# Patient Record
Sex: MALE | Race: WHITE
[De-identification: names, ages, dates, MRNs, and addresses within clinical notes are randomized per-mention and may not be internally consistent; named-entity substitution may affect disease eponyms.]

---

## 2020-10-27 ENCOUNTER — HOSPITAL ENCOUNTER (OUTPATIENT)
Age: 40
End: 2020-10-27
Payer: COMMERCIAL

## 2020-10-27 DIAGNOSIS — R10.11: Primary | ICD-10-CM

## 2020-10-27 LAB
ADD MANUAL DIFF / SLIDE REVIEW: NO
ALBUMIN SERPL-MCNC: 4.8 G/DL (ref 3.5–5)
ALBUMIN/GLOB SERPL: 1.3 {RATIO} (ref 1–2.8)
ALP SERPL-CCNC: 76 U/L (ref 38–126)
ALT SERPL-CCNC: 55 IU/L (ref ?–50)
BUN SERPL-MCNC: 15 MG/DL (ref 9–20)
CALCIUM SERPL-MCNC: 9.6 MG/DL (ref 8.4–10.2)
CHLORIDE SERPL-SCNC: 102 MMOL/L (ref 98–107)
CO2 SERPL-SCNC: 31 MMOL/L (ref 22–32)
ESTIMATED GLOMERULAR FILT RATE: > 60 ML/MIN (ref 60–?)
GLOBULIN SER CALC-MCNC: 3.6 G/DL (ref 1.7–4.1)
GLUCOSE SERPL-MCNC: 95 MG/DL (ref 70–100)
HEMATOCRIT: 44.4 % (ref 41–53)
HEMOGLOBIN: 15 G/DL (ref 13.5–17.5)
HEMOLYSIS: < 15 (ref 0–50)
LIPASE SERPL-CCNC: 99 U/L (ref 23–300)
LYMPHOCYTES # SPEC AUTO: 3200 /UL (ref 1100–4500)
MCV RBC: 92.5 FL (ref 80–100)
MEAN CORPUSCULAR HEMOGLOBIN: 31.2 PG (ref 26–34)
MEAN CORPUSCULAR HGB CONC: 33.8 % (ref 30–36)
PLATELET COUNT: 188 X10^3/UL (ref 150–400)
POTASSIUM SERPL-SCNC: 4 MMOL/L (ref 3.4–5.1)
PROT SERPL-MCNC: 8.4 G/DL (ref 6.3–8.2)
SODIUM SERPL-SCNC: 139 MMOL/L (ref 137–145)

## 2020-10-27 PROCEDURE — 36415 COLL VENOUS BLD VENIPUNCTURE: CPT

## 2020-10-27 PROCEDURE — 80053 COMPREHEN METABOLIC PANEL: CPT

## 2020-10-27 PROCEDURE — 85651 RBC SED RATE NONAUTOMATED: CPT

## 2020-10-27 PROCEDURE — 85025 COMPLETE CBC W/AUTO DIFF WBC: CPT

## 2020-10-27 PROCEDURE — 83690 ASSAY OF LIPASE: CPT

## 2020-10-27 PROCEDURE — 86140 C-REACTIVE PROTEIN: CPT

## 2021-09-27 ENCOUNTER — HOSPITAL ENCOUNTER (OUTPATIENT)
Age: 41
End: 2021-09-27
Payer: COMMERCIAL

## 2021-09-27 DIAGNOSIS — U07.1: Primary | ICD-10-CM

## 2021-09-27 LAB — SARS-COV-2 RDRP RESP QL NAA+PROBE: POSITIVE

## 2021-09-27 PROCEDURE — 87635 SARS-COV-2 COVID-19 AMP PRB: CPT

## 2022-03-14 ENCOUNTER — HOSPITAL ENCOUNTER (EMERGENCY)
Age: 42
Discharge: HOME | End: 2022-03-14
Payer: COMMERCIAL

## 2022-03-14 VITALS — RESPIRATION RATE: 18 BRPM | HEART RATE: 52 BPM | OXYGEN SATURATION: 99 %

## 2022-03-14 VITALS
OXYGEN SATURATION: 99 % | RESPIRATION RATE: 22 BRPM | SYSTOLIC BLOOD PRESSURE: 196 MMHG | HEART RATE: 52 BPM | DIASTOLIC BLOOD PRESSURE: 104 MMHG

## 2022-03-14 VITALS — BODY MASS INDEX: 29.7 KG/M2

## 2022-03-14 VITALS — HEART RATE: 49 BPM | RESPIRATION RATE: 18 BRPM | OXYGEN SATURATION: 99 %

## 2022-03-14 VITALS
TEMPERATURE: 97.8 F | HEART RATE: 56 BPM | SYSTOLIC BLOOD PRESSURE: 239 MMHG | OXYGEN SATURATION: 100 % | DIASTOLIC BLOOD PRESSURE: 127 MMHG | RESPIRATION RATE: 17 BRPM

## 2022-03-14 VITALS
SYSTOLIC BLOOD PRESSURE: 172 MMHG | DIASTOLIC BLOOD PRESSURE: 97 MMHG | HEART RATE: 51 BPM | OXYGEN SATURATION: 98 % | RESPIRATION RATE: 17 BRPM

## 2022-03-14 VITALS — OXYGEN SATURATION: 98 % | HEART RATE: 65 BPM | RESPIRATION RATE: 21 BRPM

## 2022-03-14 VITALS — OXYGEN SATURATION: 99 % | HEART RATE: 49 BPM | RESPIRATION RATE: 17 BRPM

## 2022-03-14 VITALS — RESPIRATION RATE: 21 BRPM | HEART RATE: 64 BPM | OXYGEN SATURATION: 99 %

## 2022-03-14 VITALS — HEART RATE: 48 BPM | OXYGEN SATURATION: 99 % | RESPIRATION RATE: 23 BRPM

## 2022-03-14 DIAGNOSIS — Z20.822: ICD-10-CM

## 2022-03-14 DIAGNOSIS — R51.9: Primary | ICD-10-CM

## 2022-03-14 LAB
ADD MANUAL DIFF / SLIDE REVIEW: NO
ALBUMIN SERPL-MCNC: 5.2 G/DL (ref 3.5–5)
ALBUMIN/GLOB SERPL: 1.5 {RATIO} (ref 1–2.8)
ALP SERPL-CCNC: 81 U/L (ref 38–126)
ALT SERPL-CCNC: 37 IU/L (ref ?–50)
BUN SERPL-MCNC: 16 MG/DL (ref 9–20)
CALCIUM SERPL-MCNC: 9.5 MG/DL (ref 8.4–10.2)
CHLORIDE SERPL-SCNC: 102 MMOL/L (ref 98–107)
CK SERPL-CCNC: 127 U/L (ref 55–170)
CKMB % RELATIVE INDEX: 0.8 % (ref 1.5–5)
CO2 SERPL-SCNC: 28 MMOL/L (ref 22–32)
ESTIMATED GLOMERULAR FILT RATE: > 60 ML/MIN (ref 60–?)
GLOBULIN SER CALC-MCNC: 3.5 G/DL (ref 1.7–4.1)
GLUCOSE SERPL-MCNC: 110 MG/DL (ref 70–100)
HEMATOCRIT: 44.9 % (ref 41–53)
HEMOGLOBIN: 15.6 G/DL (ref 13.5–17.5)
HEMOLYSIS: 25 (ref 0–50)
INR PPP: 1 (ref 0.9–1.3)
LIPASE SERPL-CCNC: 119 U/L (ref 23–300)
LYMPHOCYTES # SPEC AUTO: 5900 /UL (ref 1100–4500)
MAGNESIUM SERPL-MCNC: 2.2 MG/DL (ref 1.6–2.3)
MCV RBC: 94.5 FL (ref 80–100)
MEAN CORPUSCULAR HEMOGLOBIN: 32.7 PG (ref 26–34)
MEAN CORPUSCULAR HGB CONC: 34.6 % (ref 30–36)
NT-PROBNP SERPL-MCNC: 58 PG/ML (ref ?–125)
PLATELET COUNT: 213 X10^3/UL (ref 150–400)
POTASSIUM SERPL-SCNC: 3.9 MMOL/L (ref 3.4–5.1)
PROT SERPL-MCNC: 8.7 G/DL (ref 6.3–8.2)
PROTHROMBIN TIME: 11.4 SECONDS (ref 10.1–12.7)
PTT PARTIAL THROMBOPLASTIN TIM: 31 SECONDS (ref 26.4–36.2)
SODIUM SERPL-SCNC: 138 MMOL/L (ref 137–145)
TROPONIN I SERPL-MCNC: < 0.012 NG/ML (ref 0.01–0.03)

## 2022-03-14 PROCEDURE — 85025 COMPLETE CBC W/AUTO DIFF WBC: CPT

## 2022-03-14 PROCEDURE — 87635 SARS-COV-2 COVID-19 AMP PRB: CPT

## 2022-03-14 PROCEDURE — 80053 COMPREHEN METABOLIC PANEL: CPT

## 2022-03-14 PROCEDURE — 99284 EMERGENCY DEPT VISIT MOD MDM: CPT

## 2022-03-14 PROCEDURE — 85610 PROTHROMBIN TIME: CPT

## 2022-03-14 PROCEDURE — 36415 COLL VENOUS BLD VENIPUNCTURE: CPT

## 2022-03-14 PROCEDURE — 85730 THROMBOPLASTIN TIME PARTIAL: CPT

## 2022-03-14 PROCEDURE — 93005 ELECTROCARDIOGRAM TRACING: CPT

## 2022-03-14 PROCEDURE — 71045 X-RAY EXAM CHEST 1 VIEW: CPT

## 2022-03-14 PROCEDURE — 82553 CREATINE MB FRACTION: CPT

## 2022-03-14 PROCEDURE — 82550 ASSAY OF CK (CPK): CPT

## 2022-03-14 PROCEDURE — 83690 ASSAY OF LIPASE: CPT

## 2022-03-14 PROCEDURE — 84484 ASSAY OF TROPONIN QUANT: CPT

## 2022-03-14 PROCEDURE — 83880 ASSAY OF NATRIURETIC PEPTIDE: CPT

## 2022-03-14 PROCEDURE — 83735 ASSAY OF MAGNESIUM: CPT

## 2022-03-14 PROCEDURE — 93010 ELECTROCARDIOGRAM REPORT: CPT

## 2022-03-14 PROCEDURE — 70450 CT HEAD/BRAIN W/O DYE: CPT

## 2022-03-18 ENCOUNTER — HOSPITAL ENCOUNTER (EMERGENCY)
Age: 42
Discharge: HOME | End: 2022-03-18
Payer: COMMERCIAL

## 2022-03-18 VITALS
SYSTOLIC BLOOD PRESSURE: 156 MMHG | RESPIRATION RATE: 19 BRPM | OXYGEN SATURATION: 99 % | DIASTOLIC BLOOD PRESSURE: 105 MMHG | HEART RATE: 60 BPM

## 2022-03-18 VITALS
SYSTOLIC BLOOD PRESSURE: 180 MMHG | HEART RATE: 63 BPM | RESPIRATION RATE: 14 BRPM | OXYGEN SATURATION: 98 % | DIASTOLIC BLOOD PRESSURE: 108 MMHG

## 2022-03-18 VITALS
SYSTOLIC BLOOD PRESSURE: 145 MMHG | OXYGEN SATURATION: 99 % | HEART RATE: 58 BPM | RESPIRATION RATE: 18 BRPM | DIASTOLIC BLOOD PRESSURE: 105 MMHG

## 2022-03-18 VITALS — RESPIRATION RATE: 22 BRPM | HEART RATE: 58 BPM | OXYGEN SATURATION: 98 %

## 2022-03-18 VITALS
DIASTOLIC BLOOD PRESSURE: 104 MMHG | OXYGEN SATURATION: 99 % | RESPIRATION RATE: 20 BRPM | HEART RATE: 56 BPM | SYSTOLIC BLOOD PRESSURE: 139 MMHG

## 2022-03-18 VITALS
DIASTOLIC BLOOD PRESSURE: 99 MMHG | OXYGEN SATURATION: 99 % | RESPIRATION RATE: 18 BRPM | HEART RATE: 59 BPM | SYSTOLIC BLOOD PRESSURE: 145 MMHG

## 2022-03-18 VITALS
DIASTOLIC BLOOD PRESSURE: 109 MMHG | OXYGEN SATURATION: 98 % | SYSTOLIC BLOOD PRESSURE: 176 MMHG | HEART RATE: 64 BPM | RESPIRATION RATE: 15 BRPM

## 2022-03-18 VITALS
DIASTOLIC BLOOD PRESSURE: 114 MMHG | HEART RATE: 58 BPM | OXYGEN SATURATION: 98 % | RESPIRATION RATE: 18 BRPM | SYSTOLIC BLOOD PRESSURE: 150 MMHG

## 2022-03-18 VITALS — OXYGEN SATURATION: 98 % | RESPIRATION RATE: 23 BRPM | HEART RATE: 55 BPM

## 2022-03-18 VITALS
TEMPERATURE: 97.88 F | RESPIRATION RATE: 15 BRPM | SYSTOLIC BLOOD PRESSURE: 181 MMHG | OXYGEN SATURATION: 99 % | DIASTOLIC BLOOD PRESSURE: 116 MMHG | HEART RATE: 66 BPM

## 2022-03-18 VITALS
SYSTOLIC BLOOD PRESSURE: 140 MMHG | RESPIRATION RATE: 19 BRPM | DIASTOLIC BLOOD PRESSURE: 95 MMHG | HEART RATE: 56 BPM | OXYGEN SATURATION: 99 %

## 2022-03-18 VITALS
HEART RATE: 54 BPM | DIASTOLIC BLOOD PRESSURE: 95 MMHG | OXYGEN SATURATION: 99 % | SYSTOLIC BLOOD PRESSURE: 155 MMHG | RESPIRATION RATE: 20 BRPM

## 2022-03-18 VITALS
OXYGEN SATURATION: 99 % | SYSTOLIC BLOOD PRESSURE: 145 MMHG | RESPIRATION RATE: 18 BRPM | DIASTOLIC BLOOD PRESSURE: 96 MMHG | HEART RATE: 54 BPM

## 2022-03-18 VITALS — BODY MASS INDEX: 29.7 KG/M2

## 2022-03-18 VITALS
RESPIRATION RATE: 20 BRPM | OXYGEN SATURATION: 83 % | HEART RATE: 53 BPM | SYSTOLIC BLOOD PRESSURE: 154 MMHG | DIASTOLIC BLOOD PRESSURE: 97 MMHG

## 2022-03-18 DIAGNOSIS — I10: ICD-10-CM

## 2022-03-18 DIAGNOSIS — R51.9: Primary | ICD-10-CM

## 2022-03-18 PROCEDURE — 96372 THER/PROPH/DIAG INJ SC/IM: CPT

## 2022-03-18 PROCEDURE — 99283 EMERGENCY DEPT VISIT LOW MDM: CPT

## 2022-04-28 ENCOUNTER — HOSPITAL ENCOUNTER (OUTPATIENT)
Age: 42
End: 2022-04-28
Payer: COMMERCIAL

## 2022-04-28 DIAGNOSIS — I10: Primary | ICD-10-CM

## 2022-04-28 DIAGNOSIS — Z13.6: ICD-10-CM

## 2022-04-28 LAB
BUN SERPL-MCNC: 14 MG/DL (ref 9–20)
CALCIUM SERPL-MCNC: 9.1 MG/DL (ref 8.4–10.2)
CHLORIDE SERPL-SCNC: 102 MMOL/L (ref 98–107)
CHOLEST SERPL-MCNC: 224 MG/DL (ref 140–199)
CO2 SERPL-SCNC: 26 MMOL/L (ref 22–32)
ESTIMATED GLOMERULAR FILT RATE: > 60 ML/MIN (ref 60–?)
GLUCOSE SERPL-MCNC: 108 MG/DL (ref 70–100)
HDLC SERPL-MCNC: 53 MG/DL (ref 40–60)
HEMOLYSIS: < 15 (ref 0–50)
POTASSIUM SERPL-SCNC: 4.2 MMOL/L (ref 3.4–5.1)
SODIUM SERPL-SCNC: 138 MMOL/L (ref 137–145)
TRIGL SERPL-MCNC: 102 MG/DL (ref 35–150)
TSH W/ REFLEX TO FT4: 1.44 UIU/ML (ref 0.47–4.68)

## 2022-04-28 PROCEDURE — 80048 BASIC METABOLIC PNL TOTAL CA: CPT

## 2022-04-28 PROCEDURE — 80061 LIPID PANEL: CPT

## 2022-04-28 PROCEDURE — 84443 ASSAY THYROID STIM HORMONE: CPT

## 2022-04-28 PROCEDURE — 36415 COLL VENOUS BLD VENIPUNCTURE: CPT

## 2022-08-31 ENCOUNTER — HOSPITAL ENCOUNTER (OUTPATIENT)
Age: 42
End: 2022-08-31
Payer: COMMERCIAL

## 2022-08-31 DIAGNOSIS — E78.2: Primary | ICD-10-CM

## 2022-08-31 LAB
CHOLEST SERPL-MCNC: 245 MG/DL (ref 140–199)
HDLC SERPL-MCNC: 51 MG/DL (ref 40–60)
TRIGL SERPL-MCNC: 89 MG/DL (ref 35–150)

## 2022-08-31 PROCEDURE — 36415 COLL VENOUS BLD VENIPUNCTURE: CPT

## 2022-08-31 PROCEDURE — 80061 LIPID PANEL: CPT

## 2023-08-25 ENCOUNTER — HOSPITAL ENCOUNTER (OUTPATIENT)
Age: 43
End: 2023-08-25
Payer: COMMERCIAL

## 2023-08-25 DIAGNOSIS — M25.572: Primary | ICD-10-CM

## 2023-08-25 DIAGNOSIS — M79.89: ICD-10-CM

## 2023-08-25 PROCEDURE — 73630 X-RAY EXAM OF FOOT: CPT

## 2024-12-21 ENCOUNTER — HOSPITAL ENCOUNTER (OUTPATIENT)
Age: 44
End: 2024-12-21
Payer: COMMERCIAL

## 2024-12-21 ENCOUNTER — HOSPITAL ENCOUNTER (EMERGENCY)
Age: 44
Discharge: HOME | End: 2024-12-21
Payer: COMMERCIAL

## 2024-12-21 VITALS
TEMPERATURE: 98.4 F | DIASTOLIC BLOOD PRESSURE: 136 MMHG | SYSTOLIC BLOOD PRESSURE: 185 MMHG | OXYGEN SATURATION: 95 % | HEART RATE: 88 BPM | RESPIRATION RATE: 17 BRPM

## 2024-12-21 VITALS — DIASTOLIC BLOOD PRESSURE: 119 MMHG | HEART RATE: 88 BPM | SYSTOLIC BLOOD PRESSURE: 166 MMHG

## 2024-12-21 VITALS — HEART RATE: 78 BPM | SYSTOLIC BLOOD PRESSURE: 166 MMHG | OXYGEN SATURATION: 95 % | DIASTOLIC BLOOD PRESSURE: 119 MMHG

## 2024-12-21 VITALS — OXYGEN SATURATION: 92 % | HEART RATE: 76 BPM | SYSTOLIC BLOOD PRESSURE: 162 MMHG | DIASTOLIC BLOOD PRESSURE: 115 MMHG

## 2024-12-21 VITALS — SYSTOLIC BLOOD PRESSURE: 157 MMHG | DIASTOLIC BLOOD PRESSURE: 117 MMHG | HEART RATE: 84 BPM | OXYGEN SATURATION: 92 %

## 2024-12-21 VITALS — HEART RATE: 69 BPM | SYSTOLIC BLOOD PRESSURE: 152 MMHG | DIASTOLIC BLOOD PRESSURE: 111 MMHG | OXYGEN SATURATION: 94 %

## 2024-12-21 VITALS — OXYGEN SATURATION: 92 % | HEART RATE: 79 BPM

## 2024-12-21 VITALS — OXYGEN SATURATION: 92 % | HEART RATE: 86 BPM

## 2024-12-21 DIAGNOSIS — J10.1: Primary | ICD-10-CM

## 2024-12-21 DIAGNOSIS — I10: ICD-10-CM

## 2024-12-21 DIAGNOSIS — R50.9: Primary | ICD-10-CM

## 2024-12-21 DIAGNOSIS — R05.1: ICD-10-CM

## 2024-12-21 LAB
FLUAV RNA UPPER RESP QL NAA+PROBE: (no result)
FLUBV RNA UPPER RESP QL NAA+PROBE: (no result)

## 2024-12-21 PROCEDURE — 71046 X-RAY EXAM CHEST 2 VIEWS: CPT

## 2024-12-21 PROCEDURE — 0241U: CPT

## 2024-12-21 PROCEDURE — 99283 EMERGENCY DEPT VISIT LOW MDM: CPT

## 2024-12-21 NOTE — ED_ITS
HPI - General Adult    
General    
Chief complaint: Upper Respiratory Symptoms    
Stated complaint: Coughing , Fever and  aches    
Time Seen by Provider: 12/21/24 08:55    
History of Present Illness    
HPI narrative:     
44-year-old male with history of hypertension for which he takes lisinopril,   
with cough and muscle aches and fevers for the last few days, some degree of dry  
cough since late November, denies shortness of breath, no fevers or chills.  No   
nausea or vomiting or diarrhea.  He presented to the walk-in clinic this   
morning, they did a COVID swab, but referred him here for further evaluation.    
Patient took his blood pressure medicine this morning, 10 mg lisinopril daily,   
no missed doses recent, he has a supply of the medication at home.  He has been   
taking over-the-counter cough med education, NyQuil and some other medication,   
unclear if there is any decongestant medications within the over-the-counter   
preparations.    
Related Data    
                                Home Medications    
    
    
    
 Medication  Instructions  Recorded  Confirmed    
     
albuterol sulfate 90 mcg/actuation inhalation 12/21/24 12/21/24    
    
aerosol inhaler       
     
budesonide-formoterol HFA 80 inhalation 12/21/24 12/21/24    
    
mcg-4.5 mcg/actuation aerosol       
    
inhaler (Symbicort)       
     
inhalational spacing device #1 ea 12/21/24 12/21/24    
    
(ProChamber)       
    
    
                                  Previous Rx's    
    
    
    
 Medication  Instructions  Recorded    
     
lisinopril 10 mg tablet 10 mg PO DAILY #90 tabs 11/14/24    
     
oseltamivir 75 mg capsule (Tamiflu) 75 mg PO BID 5 days #10 caps 12/21/24    
     
oseltamivir 75 mg capsule (Tamiflu) 75 mg PO BID 5 days #10 caps 12/21/24    
    
    
    
                                    Allergies    
    
    
    
Allergy/AdvReac Type Severity Reaction Status Date / Time    
     
No Known Drug Allergies Allergy   Verified 12/21/24 09:03    
    
    
    
    
Patient History    
Medical History (Updated 12/21/24 @ 10:46 by Felix Crowley MD)    
    
Spasm of abdominal muscles of right side    
Bilateral foot pain    
Swelling of both ankles    
Depression (~2000)    
Anxiety (~2000)    
Chronic back pain (~2000)    
Chicken pox (~1980)    
Skin cancer (~2015)    
Hyperlipidemia, mixed    
Snoring    
Hypertension (~2022)    
Headache    
    
    
Surgical History (Reviewed 08/25/23 @ 16:11 by Cait Tabor PA-C)    
    
Anesthesia    
History of eyelid surgery (~2021)    
Hx of appendectomy (~2002)    
    
    
Family History (Reviewed 08/25/23 @ 16:11 by Cait Tabor PA-C)    
Father      No problems noted.    
    
Mother   Hypertension    
   Hypothyroid    
    
    
Social History (Reviewed 03/18/22 @ 21:47 by Abdias Antoine DO)    
Smoking Status:  Never smoker     
    
    
Smoking Status: Never smoker    
alcohol intake frequency: a few times a week    
    
Exam    
Narrative    
Exam Narrative:     
GENERAL:  Well-developed patient, in mild distress.    
HEAD: Atraumatic. Normocephalic.     
EYES: Pupils equal round and reactive. Extraocular motions intact. No scleral   
icterus. No injection or drainage.     
ENT: Nose without bleeding, purulent drainage. Throat without erythema,   
tonsillar hypertrophy or exudate. Airway patent.    
NECK: Trachea midline. Non tender    
CARDIOVASCULAR: Regular rate and rhythm without murmurs, gallops, or rubs.     
RESPIRATORY: Clear to auscultation. Breath sounds equal bilaterally. No wheezes,  
rales, or rhonchi.      
GASTROINTESTINAL: Abdomen soft, non-tender, nondistended.     
EXTREMITIES: No edema or joint tenderness.     
BACK: Nontender without deformity or crepitance. No flank tenderness.    
NEURO: AOx3.  Motor functions grossly nonfocal    
SKIN: No rash or erythema of visible areas    
    
Initial Vital Signs    
Initial Vital Signs:     
    
Vital Signs    
    
    
    
Temperature  98.4 F   12/21/24 08:56    
     
Pulse Rate  88   12/21/24 08:56    
     
Respiratory Rate  17   12/21/24 08:56    
     
Blood Pressure  185/136 H  12/21/24 08:56    
     
Pulse Oximetry  95   12/21/24 08:56    
     
Oxygen Delivery Method  Room Air  12/21/24 08:56    
    
    
    
    
    
Course    
Orders    
Ordered:     
    
                                            
    
    
Discontinued Medications    
    
Lisinopril (Lisinopril 20 Mg Tablet)  20 mg PO NOW ONE    
   Stop: 12/21/24 09:47    
   Last Admin: 12/21/24 09:53  Dose: 20 mg    
   Documented By: BRANDO    
Oseltamivir Phosphate (Oseltamivir 75 Mg Capsule)  75 mg PO NOW ONE    
   Stop: 12/21/24 10:41    
   Last Admin: 12/21/24 11:00  Dose: 75 mg    
   Documented By: BRANDO    
    
    
    
Vital Signs    
Vital signs:     
    
                               Vital Signs - 8 hr    
    
    
    
 12/21/24    
08:56    
     
Temperature 98.4 F    
     
Pulse Rate 88    
     
Respiratory Rate 17    
     
Blood Pressure 185/136 H    
     
Pulse Oximetry 95    
     
Oxygen Delivery Method Room Air    
    
    
    
    
    
Medical Decision Making    
[facility] Narrative    
Medical decision making narrative:     
44-year-old male with history of hypertension for which she takes lisinopril 10   
mg daily, with ongoing cough since late November, now with a few days' duration   
of chills and subjective fevers, taking over-the-counter DayQuil and other cough  
cold medications, COVID/influenza swab sent from walk-in clinic, here for   
further evaluation.  Blood pressure elevation noted 180/110, he took his   
lisinopril this morning, we will give lisinopril 20 mg additional dose for now,   
patient agreeable to this plan.  We will hold off on EKG and blood work and   
urinalysis for now.  Chest x-ray ordered from triage, results still pending.    
COVID/influenza swab results sent from clinic, we will track down results.    
    
CXR negatgive, see radiology report    
    
Covid negative, FluA positive, FluB negative, RSV negative, from clinic [facility], see hospital lab reports    
    
Elevated BP, given Lisinopril additional dose, consider increased maintenance   
dose 20mg daily.  DC taking over-counter cold medicatons.    
    
Patient interested in atniviral treatment, gave oral dose Tamiflu, further oral   
Rx course sent to pharmacy.  Discussed symptomatic treatment, antipyretics,   
hydration, return precautions.    
    
Discharge Plan    
Departure    
Patient Disposition: Home    
    
Clinical Impression:    
 Influenza A, Hypertension    
    
    
Activity Restrictions/Additional Instructions:    
Ongoing cough, recently increased, fevers and chills, respiratory swab panel   
COVID influenza was sent from walk-in clinic, this was positive for influenza   
type A.  Antiviral Tamiflu might be able to shorten the illness duration and/or   
severity.  First dose of Tamiflu given in the emergency department, further   
course sent to your pharmacy.  Elevated blood pressure, consider increased dose   
of lisinopril from 10 mg to 20 mg for now, recheck blood pressure at home and   
with your regular doctor next week.  Consider also ongoing cough sometimes can   
be related to ACE inhibitors like lisinopril, if you have persistent cough for   
weeks in the future then perhaps change from ACE inhibitor to some other class   
of antihypertensive might be necessary.  For now consider taking your   
lisinopril.  Take Tamiflu as directed.  Drink plenty of fluids.  Take Tylenol   
and or Motrin as needed for fever control.  Recheck blood pressure and symptoms   
with your doctor this next week.  Return to this/nearest emergency department   
for any change worsening symptoms or any concerns prior    
    
Prescriptions:    
New    
  oseltamivir [Tamiflu] 75 mg capsule     
   75 mg PO BID 5 Days Qty: 10 0RF    
    
No Action    
  budesonide-formoterol [Symbicort] 80-4.5 mcg/actuation HFA aerosol inhaler     
     inhalation      
  albuterol sulfate 90 mcg/actuation HFA aerosol inhaler     
     inhalation      
  (DME) ProChamber  Spacer     
   See Rx Instructions  .ROUTE .MEDSUPPLY Qty: 1     
   Patient Comments:    
   USE WITH INHALER    
   Rx Instructions:    
   As directed    
  oseltamivir [Tamiflu] 75 mg capsule     
   75 mg PO BID 5 Days Qty: 10 0RF    
  lisinopril 10 mg tablet     
   10 mg PO DAILY Qty: 90 0RF    
    
Referrals:    
Juan Jose Dejesus DO [Primary Care Provider] -     
    
Stand Alone Forms:  Patient Portal/API/Survey

## 2024-12-21 NOTE — DI.RAD.S_ITS
PROCEDURE:  XR CHEST 2V 
  
INDICATIONS:  cough, fever several weeks 
  
TECHNIQUE:  2 views of the chest were acquired.   
  
COMPARISON:  [facility], CR, XR CHEST 1V, 3/14/2022, 16:03. 
  
FINDINGS:   
  
Surgical changes and devices:  None.   
  
Lungs and pleura:  An incomplete inspiratory result is noted, causing a crowded  
appearance to the lung markings.  No focal infiltrates are seen.  No pneumothorax or  
significant pleural effusions are seen.    
  
Mediastinum:  Mediastinal contours are normal.  Heart size is normal.   
  
Bones and chest wall:  No suspicious bony abnormalities.  Soft tissues appear  
unremarkable.   
  
  
IMPRESSION:   
  
Low lung volumes, without an acute abnormality seen by plain film. 
  
  
  
  
Dictated by: Enzo Mcdowell M.D. on 12/21/2024 at 8:12      
Approved by: Enzo Mcdowell M.D. on 12/21/2024 at 8:12

## 2024-12-21 NOTE — ED.GENADULT
HPI - General Adult
General
Chief complaint: Upper Respiratory Symptoms
Stated complaint: Coughing , Fever and  aches
Time Seen by Provider: 12/21/24 08:55
History of Present Illness
HPI narrative: 
44-year-old male with history of hypertension for which he takes lisinopril, with cough and muscle aches and fevers for the last few days, some degree of dry cough since late November, denies shortness of breath, no fevers or chills.  No nausea or 
vomiting or diarrhea.  He presented to the walk-in clinic this morning, they did a COVID swab, but referred him here for further evaluation.  Patient took his blood pressure medicine this morning, 10 mg lisinopril daily, no missed doses recent, he 
has a supply of the medication at home.  He has been taking over-the-counter cough med education, NyQuil and some other medication, unclear if there is any decongestant medications within the over-the-counter preparations.
Related Data
Home Medications

 Medication  Instructions  Recorded  Confirmed
albuterol sulfate 90 mcg/actuation inhalation 12/21/24 12/21/24
aerosol inhaler   
budesonide-formoterol HFA 80 inhalation 12/21/24 12/21/24
mcg-4.5 mcg/actuation aerosol   
inhaler (Symbicort)   
inhalational spacing device #1 ea 12/21/24 12/21/24
(ProChamber)   

Previous Rx's

 Medication  Instructions  Recorded
lisinopril 10 mg tablet 10 mg PO DAILY #90 tabs 11/14/24
oseltamivir 75 mg capsule (Tamiflu) 75 mg PO BID 5 days #10 caps 12/21/24
oseltamivir 75 mg capsule (Tamiflu) 75 mg PO BID 5 days #10 caps 12/21/24


Allergies

Allergy/AdvReac Type Severity Reaction Status Date / Time
No Known Drug Allergies Allergy   Verified 12/21/24 09:03



Patient History
Medical History (Updated 12/21/24 @ 10:46 by Felix Crowley MD)

Spasm of abdominal muscles of right side
Bilateral foot pain
Swelling of both ankles
Depression (~2000)
Anxiety (~2000)
Chronic back pain (~2000)
Chicken pox (~1980)
Skin cancer (~2015)
Hyperlipidemia, mixed
Snoring
Hypertension (~2022)
Headache


Surgical History (Reviewed 08/25/23 @ 16:11 by Cait Tabor PA-C)

Anesthesia
History of eyelid surgery (~2021)
Hx of appendectomy (~2002)


Family History (Reviewed 08/25/23 @ 16:11 by Cait Tabor PA-C)
Father
    No problems noted.

Mother
 Hypertension
 Hypothyroid


Social History (Reviewed 03/18/22 @ 21:47 by Abdias Antoine DO)
Smoking Status:  Never smoker 


Smoking Status: Never smoker
alcohol intake frequency: a few times a week

Exam
Narrative
Exam Narrative: 
GENERAL:  Well-developed patient, in mild distress.
HEAD: Atraumatic. Normocephalic. 
EYES: Pupils equal round and reactive. Extraocular motions intact. No scleral icterus. No injection or drainage. 
ENT: Nose without bleeding, purulent drainage. Throat without erythema, tonsillar hypertrophy or exudate. Airway patent.
NECK: Trachea midline. Non tender
CARDIOVASCULAR: Regular rate and rhythm without murmurs, gallops, or rubs. 
RESPIRATORY: Clear to auscultation. Breath sounds equal bilaterally. No wheezes, rales, or rhonchi.  
GASTROINTESTINAL: Abdomen soft, non-tender, nondistended. 
EXTREMITIES: No edema or joint tenderness. 
BACK: Nontender without deformity or crepitance. No flank tenderness.
NEURO: AOx3.  Motor functions grossly nonfocal
SKIN: No rash or erythema of visible areas

Initial Vital Signs
Initial Vital Signs: 

Vital Signs

Temperature  98.4 F   12/21/24 08:56
Pulse Rate  88   12/21/24 08:56
Respiratory Rate  17   12/21/24 08:56
Blood Pressure  185/136 H  12/21/24 08:56
Pulse Oximetry  95   12/21/24 08:56
Oxygen Delivery Method  Room Air  12/21/24 08:56




Course
Orders
Ordered: 




Discontinued Medications

Lisinopril (Lisinopril 20 Mg Tablet)  20 mg PO NOW ONE
 Stop: 12/21/24 09:47
 Last Admin: 12/21/24 09:53  Dose: 20 mg
 Documented By: BRANDO
Oseltamivir Phosphate (Oseltamivir 75 Mg Capsule)  75 mg PO NOW ONE
 Stop: 12/21/24 10:41
 Last Admin: 12/21/24 11:00  Dose: 75 mg
 Documented By: BRANDO



Vital Signs
Vital signs: 

Vital Signs - 8 hr

 12/21/24
08:56
Temperature 98.4 F
Pulse Rate 88
Respiratory Rate 17
Blood Pressure 185/136 H
Pulse Oximetry 95
Oxygen Delivery Method Room Air




Medical Decision Making
[facility] Narrative
Medical decision making narrative: 
44-year-old male with history of hypertension for which she takes lisinopril 10 mg daily, with ongoing cough since late November, now with a few days' duration of chills and subjective fevers, taking over-the-counter DayQuil and other cough cold 
medications, COVID/influenza swab sent from walk-in clinic, here for further evaluation.  Blood pressure elevation noted 180/110, he took his lisinopril this morning, we will give lisinopril 20 mg additional dose for now, patient agreeable to this 
plan.  We will hold off on EKG and blood work and urinalysis for now.  Chest x-ray ordered from triage, results still pending.  COVID/influenza swab results sent from clinic, we will track down results.

CXR negatgive, see radiology report

Covid negative, FluA positive, FluB negative, RSV negative, from [facility], see hospital lab reports

Elevated BP, given Lisinopril additional dose, consider increased maintenance dose 20mg daily.  DC taking over-counter cold medicatons.

Patient interested in atniviral treatment, gave oral dose Tamiflu, further oral Rx course sent to pharmacy.  Discussed symptomatic treatment, antipyretics, hydration, return precautions.

Discharge Plan
Departure
Patient Disposition: Home

Clinical Impression:
 Influenza A, Hypertension


Activity Restrictions/Additional Instructions:
Ongoing cough, recently increased, fevers and chills, respiratory swab panel COVID influenza was sent from walk-in clinic, this was positive for influenza type A.  Antiviral Tamiflu might be able to shorten the illness duration and/or severity.  
First dose of Tamiflu given in the emergency department, further course sent to your pharmacy.  Elevated blood pressure, consider increased dose of lisinopril from 10 mg to 20 mg for now, recheck blood pressure at home and with your regular doctor 
next week.  Consider also ongoing cough sometimes can be related to ACE inhibitors like lisinopril, if you have persistent cough for weeks in the future then perhaps change from ACE inhibitor to some other class of antihypertensive might be 
necessary.  For now consider taking your lisinopril.  Take Tamiflu as directed.  Drink plenty of fluids.  Take Tylenol and or Motrin as needed for fever control.  Recheck blood pressure and symptoms with your doctor this next week.  Return to 
this/nearest emergency department for any change worsening symptoms or any concerns prior

Prescriptions:
New
  oseltamivir [Tamiflu] 75 mg capsule 
   75 mg PO BID 5 Days Qty: 10 0RF

No Action
  budesonide-formoterol [Symbicort] 80-4.5 mcg/actuation HFA aerosol inhaler 
     inhalation  
  albuterol sulfate 90 mcg/actuation HFA aerosol inhaler 
     inhalation  
  (DME) ProChamber  Spacer 
   See Rx Instructions  .ROUTE .MEDSUPPLY Qty: 1 
   Patient Comments:
   USE WITH INHALER
   Rx Instructions:
   As directed
  oseltamivir [Tamiflu] 75 mg capsule 
   75 mg PO BID 5 Days Qty: 10 0RF
  lisinopril 10 mg tablet 
   10 mg PO DAILY Qty: 90 0RF

Referrals:
Juan Jose Dejesus DO [Primary Care Provider] - 

Stand Alone Forms:  Patient Portal/API/Survey

## 2025-01-17 ENCOUNTER — HOSPITAL ENCOUNTER (EMERGENCY)
Age: 45
Discharge: HOME | End: 2025-01-17
Payer: COMMERCIAL

## 2025-01-17 VITALS
OXYGEN SATURATION: 99 % | SYSTOLIC BLOOD PRESSURE: 154 MMHG | DIASTOLIC BLOOD PRESSURE: 111 MMHG | TEMPERATURE: 96.98 F | RESPIRATION RATE: 14 BRPM | HEART RATE: 97 BPM

## 2025-01-17 VITALS
HEART RATE: 72 BPM | OXYGEN SATURATION: 98 % | TEMPERATURE: 98.24 F | SYSTOLIC BLOOD PRESSURE: 148 MMHG | DIASTOLIC BLOOD PRESSURE: 67 MMHG | RESPIRATION RATE: 18 BRPM

## 2025-01-17 VITALS — HEART RATE: 72 BPM

## 2025-01-17 VITALS — BODY MASS INDEX: 29.7 KG/M2

## 2025-01-17 DIAGNOSIS — R03.0: ICD-10-CM

## 2025-01-17 DIAGNOSIS — M79.672: Primary | ICD-10-CM

## 2025-01-17 PROCEDURE — 99283 EMERGENCY DEPT VISIT LOW MDM: CPT

## 2025-01-17 PROCEDURE — 73630 X-RAY EXAM OF FOOT: CPT

## 2025-01-17 NOTE — ED_ITS
HPI - Extremity Problem    
<Jessica Miller PA-C - Last Filed: 01/17/25 12:22>    
General    
Chief complaint: Extremity Problem,Nontraumatic    
Stated complaint: Left foot pain    
Time Seen by Provider: 01/17/25 11:09    
Source: patient    
Mode of arrival: Ambulatory    
History of Present Illness    
HPI Narrative:     
Mr. Lewis is a very pleasant 44-year-old male with a past medical history of   
hypertension, hyperlipidemia, occasional foot pain presents to the emergency   
department for left foot pain x5 days.  Patient states Monday evening he started  
developing pain on the top of his left foot which has been gradually getting   
worse.  Advil and icing the foot helped temporarily.  Walking on the left foot   
exacerbates the pain.  Reports that he had similar pain in the past in both of   
his feet and most recently he had similar pain and in just the right foot which   
has since resolved.  Patient states there was no known injury however he had   
pneumonia at the end of last year and therefore had stopped running temporarily.  
 Reports a few days before the symptoms started he had just started running   
again on the treadmill.  He denies fevers, tingling, numbness, weakness, chest   
pain, shortness of breath, swelling of the leg, direct trauma.  Patient also   
notes that his blood pressure has been slightly elevated this week while he has   
been in pain but he has continued taking his lisinopril 10 mg.    
Related Data    
                                Home Medications    
    
    
    
 Medication  Instructions  Recorded  Confirmed    
     
albuterol sulfate 90 mcg/actuation inhalation 12/21/24 12/24/24    
    
aerosol inhaler       
     
budesonide-formoterol HFA 80 inhalation 12/21/24 12/24/24    
    
mcg-4.5 mcg/actuation aerosol       
    
inhaler (Symbicort)       
     
inhalational spacing device #1 ea 12/21/24 12/24/24    
    
([employer])       
    
    
                                  Previous Rx's    
    
    
    
 Medication  Instructions  Recorded    
     
lisinopril 10 mg tablet 10 mg PO DAILY #90 tabs 11/14/24    
     
amoxicillin 500 mg capsule 500 mg PO BID #20 caps 12/24/24    
     
benzonatate 200 mg capsule 200 mg PO TID PRN cough #30 caps 12/24/24    
     
guaifenesin 1,200 mg tablet, 1,200 mg PO Q12H #30 tabs 12/24/24    
    
extended release 12 hr      
     
naproxen 500 mg tablet 500 mg PO BID PRN pain #20 tabs 01/17/25    
    
    
    
                                    Allergies    
    
    
    
Allergy/AdvReac Type Severity Reaction Status Date / Time    
     
No Known Drug Allergies Allergy   Verified 01/17/25 09:41    
    
    
    
    
Review of Systems    
<Jessica Miller PA-C - Last Filed: 01/17/25 12:22>    
Review of Systems    
ROS Unobtainable: All systems reviewed & are unremarkable except as noted in HPI  
and below    
    
Patient History    
<Jessica Miller PA-C - Last Filed: 01/17/25 12:22>    
Medical History (Reviewed 01/17/25 @ 12:16 by Jessica Miller PA-C)    
    
Spasm of abdominal muscles of right side    
Bilateral foot pain    
Swelling of both ankles    
Depression (~2000)    
Anxiety (~2000)    
Chronic back pain (~2000)    
Chicken pox (~1980)    
Skin cancer (~2015)    
Hyperlipidemia, mixed    
Snoring    
Hypertension (~2022)    
Headache    
    
    
Surgical History (Reviewed 01/17/25 @ 12:16 by Jessica Miller PA-C)    
    
Anesthesia    
History of eyelid surgery (~2021)    
Hx of appendectomy (~2002)    
    
    
Family History (Reviewed 01/17/25 @ 12:16 by Jessica Miller PA-C)    
Father      No problems noted.    
    
Mother   Hypertension    
   Hypothyroid    
    
    
Social History (Reviewed 01/17/25 @ 12:16 by Jessica Miller PA-C)    
Smoking Status:  Never smoker     
    
    
Smoking Status: Never smoker    
alcohol intake frequency: a few times a week    
    
Exam    
<Jessica Miller PA-C - Last Filed: 01/17/25 12:22>    
Narrative    
Exam Narrative:     
GENERAL: 44 year old patient appears stated age. Well-developed patient, in no   
acute distress.    
CARDIOVASCULAR: Regular rate and rhythm.    
RESPIRATORY: ?Nonlabored respirations. ?Speaking in clear, full sentences. ??    
EXTREMITIES:  Nonfocal tenderness to palpation of the dorsal left foot with min  
imal erythema overlying this area.  Active passive dorsiflexion and plantar   
flexion intact.  Strong DP and PT pulse.  Brisk capillary refill on toes.  No   
tenderness to palpation of plantar aspect of foot or phalanxes.  No ankle   
tenderness or calf swelling.    
NEURO: AOx3. ?Clear speech. ?Moves all 4 extremities appropriately.  Sensation   
intact to light touch on the plantar and dorsal aspect of the left foot.    
SKIN: No rash or erythema of visible areas    
Initial Vital Signs    
Initial Vital Signs:     
    
Vital Signs    
    
    
    
Temperature  97.0 F L  01/17/25 09:39    
     
Pulse Rate  97 H  01/17/25 09:39    
     
Respiratory Rate  14   01/17/25 09:39    
     
Blood Pressure  154/111 H  01/17/25 09:39    
     
Pulse Oximetry  99   01/17/25 09:39    
     
Oxygen Delivery Method  Room Air  01/17/25 09:39    
    
    
    
    
    
<Danita Nolasco MD - Last Filed: 01/17/25 15:24>    
Initial Vital Signs    
Initial Vital Signs:     
    
Vital Signs    
    
    
    
Temperature  97.0 F L  01/17/25 09:39    
     
Pulse Rate  97 H  01/17/25 09:39    
     
Respiratory Rate  14   01/17/25 09:39    
     
Blood Pressure  154/111 H  01/17/25 09:39    
     
Pulse Oximetry  99   01/17/25 09:39    
     
Oxygen Delivery Method  Room Air  01/17/25 09:39    
    
    
    
    
    
Course    
<Jessica Miller PA-C - Last Filed: 01/17/25 12:22>    
Orders    
Ordered:     
    
                                    ED Orders    
    
01/17/25 09:44    
XR foot LT min 3V Stat     
    
    
                                            
    
    
Discontinued Medications    
    
Acetaminophen (Acetaminophen 325 Mg Tablet)  975 mg PO NOW ONE    
   Stop: 01/17/25 11:35    
   Last Admin: 01/17/25 12:06  Dose: 975 mg    
   Documented By: RB    
Naproxen (Naproxen 250 Mg Tablet)  500 mg PO NOW ONE    
   Stop: 01/17/25 11:35    
   Last Admin: 01/17/25 12:06  Dose: 500 mg    
   Documented By: RB    
    
    
    
Vital Signs    
Vital signs:     
    
                               Vital Signs - 8 hr    
    
    
    
 01/17/25    
09:39 01/17/25    
12:03 01/17/25    
12:17    
     
Temperature 97.0 F L  98.2 F    
     
Pulse Rate 97 H  72    
     
Pulse Rate [Left Dorsalis Pedis]  72     
     
Respiratory Rate 14  18    
     
Blood Pressure 154/111 H  148/67 H    
     
Pulse Oximetry 99  98    
     
Oxygen Delivery Method Room Air  Room Air    
    
    
    
    
    
<Danita Nolasco MD - Last Filed: 01/17/25 15:24>    
Orders    
Ordered:     
    
                                    ED Orders    
    
01/17/25 09:44    
XR foot LT min 3V Stat     
    
    
                                            
    
    
Discontinued Medications    
    
Acetaminophen (Acetaminophen 325 Mg Tablet)  975 mg PO NOW ONE    
   Stop: 01/17/25 11:35    
   Last Admin: 01/17/25 12:06  Dose: 975 mg    
   Documented By: RB    
Naproxen (Naproxen 250 Mg Tablet)  500 mg PO NOW ONE    
   Stop: 01/17/25 11:35    
   Last Admin: 01/17/25 12:06  Dose: 500 mg    
   Documented By: RB    
    
    
    
Vital Signs    
Vital signs:     
    
                               Vital Signs - 8 hr    
    
    
    
 01/17/25    
09:39 01/17/25    
12:03 01/17/25    
12:17    
     
Temperature 97.0 F L  98.2 F    
     
Pulse Rate 97 H  72    
     
Pulse Rate [Left Dorsalis Pedis]  72     
     
Respiratory Rate 14  18    
     
Blood Pressure 154/111 H  148/67 H    
     
Pulse Oximetry 99  98    
     
Oxygen Delivery Method Room Air  Room Air    
    
    
    
    
    
[facility] - Extremity (Nontraumatic)    
<Jessica Miller PA-C - Last Filed: 01/17/25 12:22>    
Imaging Data    
Left Foot X-Ray:     
      Radiologist's Impression:     
PROCEDURE:  XR FOOT LT MIN 3V    
     
INDICATIONS:  foot pain    
     
TECHNIQUE:  3 views of the foot were acquired.      
     
COMPARISON:  [facility], , XR FOOT LT MIN 3V, 8/25/2023, 16:25.    
     
FINDINGS:      
     
Bones:  No fractures or dislocations.  No suspicious bony lesions.      
     
Soft tissues:  No tibiotalar joint effusion.  Achilles tendon appears normal.      
     
     
IMPRESSION:     
     
No acute bony abnormality.    
[facility] Narrative    
Medical decision making narrative:     
44-year-old male with a past medical history of hypertension, hyperlipidemia,   
occasional foot pain presents to the emergency department for left foot pain x5   
days.    
    
Differential diagnosis includes but is not limited to gout, tendinitis,   
arthritis, overuse injury, cellulitis, etc.    
    
On exam the patient is in no acute distress, nontoxic appearing, afebrile and   
not tachycardic.  Patient has subjective pain and also tenderness on the dorsal   
aspect of the left foot with very minimal erythema in this area.  No direct   
trauma to this area however he did recently start running on the treadmill prior  
to the symptoms developing.  He has had similar symptoms in the past affecting   
the right foot as well which resolved on their own/with Advil.  Left foot x-ray   
was obtained in triage which reveals no acute bony abnormalities.    
    
Physical exam and history most concerning for tendinitis versus gout.  Physical   
exam findings are not consistent with cellulitis or septic arthritis.  We will   
treat conservatively with naproxen and acetaminophen, rice therapy, follow up   
with Podiatry.  We did discuss diet relating to possible gout.  Patient was   
placed into an Ace wrap for support and given crutches to use as needed.  He   
verbalized understanding and agreement with this plan and follow up with   
Podiatry.  We did discuss strict ER return precautions and follow up with PCP.    
BP elevated in triage, decreased to 148/67, suspect related to pain however we   
did discuss daily blood pressure monitoring and PCP follow up.  Patient   
verbalized understanding of all information and is stable for discharge home,   
ambulates independently with crutches.    
    
Discharge Plan    
Departure    
Patient Disposition: Home    
    
Clinical Impression:    
 Acute pain of left foot, Elevated blood pressure reading    
    
    
Instructions:  DI for Gout, DI for Foot Pain    
    
Activity Restrictions/Additional Instructions:    
Today you were evaluated for left foot pain.  Your x-ray showed no acute bony   
abnormalities.  I am most suspicious for possible tendonitis of the left foot or  
gout.  Please call to schedule an appointment with a podiatrist at [facility] for further evaluation.  They have a location in [place] and   
in [place]. [phone].    
    
Please use RICE therapy for your pain in addition to ibuprofen/acetaminophen.     
Rest the painful area.    
Ice the area of pain/swelling for at least 15 minutes, 4x a day.    
Compress the area of swelling using a brace, wrap, or splint if applied.    
Elevate the painful or swollen extremity by supporting it above the level of the  
heart with pillows when sitting or laying.     
    
You may also take Acetaminophen 650 mg every 4-6 hours for pain or 1000mg every   
6 hours (max 3x daily). Do not exceed 3000 mg of Tylenol a day as this can cause  
liver damage. Do not drink alcohol with either of these medications.    
    
    
Your blood pressure elevation is likely related to the pain your and however it   
is still important to take your blood pressure daily, right down these values,   
and bring them to your next primary care doctor's ointment.    
    
Please follow up with your primary care doctor within the next 2-3 days for ER   
follow-up.    
(If you do not have a PCP you can call [phone]. ?to schedule an appointment  
with an  Primary Care Provider)    
IF YOU DEVELOP ANY NEW OR WORSENING SYMPTOMS, RETURN TO THE ER!    
Please read the attached instructions, they highlight more specific treatments   
and interventions for you at home.    
Thank you for letting me participate in your care,    
Jessica Miller PA-C    
    
Prescriptions:    
New    
  naproxen 500 mg tablet     
   500 mg PO BID PRN (Reason: pain) Qty: 20 0RF    
    
No Action    
  amoxicillin 500 mg capsule     
   500 mg PO BID Qty: 20 0RF    
  guaifenesin 1,200 mg tablet extended release 12hr     
   1,200 mg PO Q12H Qty: 30 0RF    
  benzonatate 200 mg capsule     
   200 mg PO TID PRN (Reason: cough) Qty: 30 0RF    
  budesonide-formoterol [Symbicort] 80-4.5 mcg/actuation HFA aerosol inhaler     
     inhalation      
  albuterol sulfate 90 mcg/actuation HFA aerosol inhaler     
     inhalation      
  (DME) [employer]     
   See Rx Instructions  .ROUTE .MEDSUPPLY Qty: 1     
   Patient Comments:    
   USE WITH INHALER    
   Rx Instructions:    
   As directed    
  lisinopril 10 mg tablet     
   10 mg PO DAILY Qty: 90 0RF    
    
Referrals:    
Juan Jose Dejesus DO [Primary Care Provider] -     
    
Stand Alone Forms:  Patient Portal/API/Survey    
    
    
ED Sign-out    
<Danita Nolasco MD - Last Filed: 01/17/25 15:24>    
Cosign    
ED Attending Gabbi Attestation:     
I was immediately available in the department for consultation throughout this   
patient's visit.  Danita Nolasco MD

## 2025-01-17 NOTE — ED.EXTPRO
HPI - Extremity Problem
<Jessica Miller PA-C - Last Filed: 01/17/25 12:22>
General
Chief complaint: Extremity Problem,Nontraumatic
Stated complaint: Left foot pain
Time Seen by Provider: 01/17/25 11:09
Source: patient
Mode of arrival: Ambulatory
History of Present Illness
HPI Narrative: 
Mr. Lewis is a very pleasant 44-year-old male with a past medical history of hypertension, hyperlipidemia, occasional foot pain presents to the emergency department for left foot pain x5 days.  Patient states Monday evening he started developing 
pain on the top of his left foot which has been gradually getting worse.  Advil and icing the foot helped temporarily.  Walking on the left foot exacerbates the pain.  Reports that he had similar pain in the past in both of his feet and most 
recently he had similar pain and in just the right foot which has since resolved.  Patient states there was no known injury however he had pneumonia at the end of last year and therefore had stopped running temporarily.  Reports a few days before 
the symptoms started he had just started running again on the treadmill.  He denies fevers, tingling, numbness, weakness, chest pain, shortness of breath, swelling of the leg, direct trauma.  Patient also notes that his blood pressure has been 
slightly elevated this week while he has been in pain but he has continued taking his lisinopril 10 mg.
Related Data
Home Medications

 Medication  Instructions  Recorded  Confirmed
albuterol sulfate 90 mcg/actuation inhalation 12/21/24 12/24/24
aerosol inhaler   
budesonide-formoterol HFA 80 inhalation 12/21/24 12/24/24
mcg-4.5 mcg/actuation aerosol   
inhaler (Symbicort)   
inhalational spacing device #1 ea 12/21/24 12/24/24
([employer])   

Previous Rx's

 Medication  Instructions  Recorded
lisinopril 10 mg tablet 10 mg PO DAILY #90 tabs 11/14/24
amoxicillin 500 mg capsule 500 mg PO BID #20 caps 12/24/24
benzonatate 200 mg capsule 200 mg PO TID PRN cough #30 caps 12/24/24
guaifenesin 1,200 mg tablet, 1,200 mg PO Q12H #30 tabs 12/24/24
extended release 12 hr  
naproxen 500 mg tablet 500 mg PO BID PRN pain #20 tabs 01/17/25


Allergies

Allergy/AdvReac Type Severity Reaction Status Date / Time
No Known Drug Allergies Allergy   Verified 01/17/25 09:41



Review of Systems
<Jessica Miller PA-C - Last Filed: 01/17/25 12:22>
Review of Systems
ROS Unobtainable: All systems reviewed & are unremarkable except as noted in HPI and below

Patient History
<Jessica Miller PA-C - Last Filed: 01/17/25 12:22>
Medical History (Reviewed 01/17/25 @ 12:16 by Jessica Miller PA-C)

Spasm of abdominal muscles of right side
Bilateral foot pain
Swelling of both ankles
Depression (~2000)
Anxiety (~2000)
Chronic back pain (~2000)
Chicken pox (~1980)
Skin cancer (~2015)
Hyperlipidemia, mixed
Snoring
Hypertension (~2022)
Headache


Surgical History (Reviewed 01/17/25 @ 12:16 by Jessica Miller PA-C)

Anesthesia
History of eyelid surgery (~2021)
Hx of appendectomy (~2002)


Family History (Reviewed 01/17/25 @ 12:16 by Jessica Miller PA-C)
Father
    No problems noted.

Mother
 Hypertension
 Hypothyroid


Social History (Reviewed 01/17/25 @ 12:16 by Jessica Miller PA-C)
Smoking Status:  Never smoker 


Smoking Status: Never smoker
alcohol intake frequency: a few times a week

Exam
<Jessica Miller PA-C - Last Filed: 01/17/25 12:22>
Narrative
Exam Narrative: 
GENERAL: 44 year old patient appears stated age. Well-developed patient, in no acute distress.
CARDIOVASCULAR: Regular rate and rhythm.
RESPIRATORY: ?Nonlabored respirations. ?Speaking in clear, full sentences. ??
EXTREMITIES:  Nonfocal tenderness to palpation of the dorsal left foot with minimal erythema overlying this area.  Active passive dorsiflexion and plantar flexion intact.  Strong DP and PT pulse.  Brisk capillary refill on toes.  No tenderness to 
palpation of plantar aspect of foot or phalanxes.  No ankle tenderness or calf swelling.
NEURO: AOx3. ?Clear speech. ?Moves all 4 extremities appropriately.  Sensation intact to light touch on the plantar and dorsal aspect of the left foot.
SKIN: No rash or erythema of visible areas
Initial Vital Signs
Initial Vital Signs: 

Vital Signs

Temperature  97.0 F L  01/17/25 09:39
Pulse Rate  97 H  01/17/25 09:39
Respiratory Rate  14   01/17/25 09:39
Blood Pressure  154/111 H  01/17/25 09:39
Pulse Oximetry  99   01/17/25 09:39
Oxygen Delivery Method  Room Air  01/17/25 09:39




<Danita Nolasco MD - Last Filed: 01/17/25 15:24>
Initial Vital Signs
Initial Vital Signs: 

Vital Signs

Temperature  97.0 F L  01/17/25 09:39
Pulse Rate  97 H  01/17/25 09:39
Respiratory Rate  14   01/17/25 09:39
Blood Pressure  154/111 H  01/17/25 09:39
Pulse Oximetry  99   01/17/25 09:39
Oxygen Delivery Method  Room Air  01/17/25 09:39




Course
<Jessica Miller PA-C - Last Filed: 01/17/25 12:22>
Orders
Ordered: 

ED Orders

01/17/25 09:44
XR foot LT min 3V Stat 





Discontinued Medications

Acetaminophen (Acetaminophen 325 Mg Tablet)  975 mg PO NOW ONE
 Stop: 01/17/25 11:35
 Last Admin: 01/17/25 12:06  Dose: 975 mg
 Documented By: RB
Naproxen (Naproxen 250 Mg Tablet)  500 mg PO NOW ONE
 Stop: 01/17/25 11:35
 Last Admin: 01/17/25 12:06  Dose: 500 mg
 Documented By: RB



Vital Signs
Vital signs: 

Vital Signs - 8 hr

 01/17/25
09:39 01/17/25
12:03 01/17/25
12:17
Temperature 97.0 F L  98.2 F
Pulse Rate 97 H  72
Pulse Rate [Left Dorsalis Pedis]  72 
Respiratory Rate 14  18
Blood Pressure 154/111 H  148/67 H
Pulse Oximetry 99  98
Oxygen Delivery Method Room Air  Room Air




<Danita Nolasco MD - Last Filed: 01/17/25 15:24>
Orders
Ordered: 

ED Orders

01/17/25 09:44
XR foot LT min 3V Stat 





Discontinued Medications

Acetaminophen (Acetaminophen 325 Mg Tablet)  975 mg PO NOW ONE
 Stop: 01/17/25 11:35
 Last Admin: 01/17/25 12:06  Dose: 975 mg
 Documented By: RB
Naproxen (Naproxen 250 Mg Tablet)  500 mg PO NOW ONE
 Stop: 01/17/25 11:35
 Last Admin: 01/17/25 12:06  Dose: 500 mg
 Documented By: BORIS



Vital Signs
Vital signs: 

Vital Signs - 8 hr

 01/17/25
09:39 01/17/25
12:03 01/17/25
12:17
Temperature 97.0 F L  98.2 F
Pulse Rate 97 H  72
Pulse Rate [Left Dorsalis Pedis]  72 
Respiratory Rate 14  18
Blood Pressure 154/111 H  148/67 H
Pulse Oximetry 99  98
Oxygen Delivery Method Room Air  Room Air




[facility] - Extremity (Nontraumatic)
<Jessica Miller PA-C - Last Filed: 01/17/25 12:22>
Imaging Data
Left Foot X-Ray: 
      Radiologist's Impression: 
PROCEDURE:  XR FOOT LT MIN 3V
 
INDICATIONS:  foot pain
 
TECHNIQUE:  3 views of the foot were acquired.  
 
COMPARISON:  [facility], , XR FOOT LT MIN 3V, 8/25/2023, 16:25.
 
FINDINGS:  
 
Bones:  No fractures or dislocations.  No suspicious bony lesions.  
 
Soft tissues:  No tibiotalar joint effusion.  Achilles tendon appears normal.  
 
 
IMPRESSION: 
 
No acute bony abnormality.
[facility] Narrative
Medical decision making narrative: 
44-year-old male with a past medical history of hypertension, hyperlipidemia, occasional foot pain presents to the emergency department for left foot pain x5 days.

Differential diagnosis includes but is not limited to gout, tendinitis, arthritis, overuse injury, cellulitis, etc.

On exam the patient is in no acute distress, nontoxic appearing, afebrile and not tachycardic.  Patient has subjective pain and also tenderness on the dorsal aspect of the left foot with very minimal erythema in this area.  No direct trauma to this 
area however he did recently start running on the treadmill prior to the symptoms developing.  He has had similar symptoms in the past affecting the right foot as well which resolved on their own/with Advil.  Left foot x-ray was obtained in triage 
which reveals no acute bony abnormalities.

Physical exam and history most concerning for tendinitis versus gout.  Physical exam findings are not consistent with cellulitis or septic arthritis.  We will treat conservatively with naproxen and acetaminophen, rice therapy, follow up with 
Podiatry.  We did discuss diet relating to possible gout.  Patient was placed into an Ace wrap for support and given crutches to use as needed.  He verbalized understanding and agreement with this plan and follow up with Podiatry.  We did discuss 
strict ER return precautions and follow up with PCP.  BP elevated in triage, decreased to 148/67, suspect related to pain however we did discuss daily blood pressure monitoring and PCP follow up.  Patient verbalized understanding of all information 
and is stable for discharge home, ambulates independently with crutches.

Discharge Plan
Departure
Patient Disposition: Home

Clinical Impression:
 Acute pain of left foot, Elevated blood pressure reading


Instructions:  DI for Gout, DI for Foot Pain

Activity Restrictions/Additional Instructions:
Today you were evaluated for left foot pain.  Your x-ray showed no acute bony abnormalities.  I am most suspicious for possible tendonitis of the left foot or gout.  Please call to schedule an appointment with a podiatrist at [facility] 
[facility] for further evaluation.  They have a location in [place] and in [place]. [phone].

Please use RICE therapy for your pain in addition to ibuprofen/acetaminophen. 
Rest the painful area.
Ice the area of pain/swelling for at least 15 minutes, 4x a day.
Compress the area of swelling using a brace, wrap, or splint if applied.
Elevate the painful or swollen extremity by supporting it above the level of the heart with pillows when sitting or laying. 

You may also take Acetaminophen 650 mg every 4-6 hours for pain or 1000mg every 6 hours (max 3x daily). Do not exceed 3000 mg of Tylenol a day as this can cause liver damage. Do not drink alcohol with either of these medications.


Your blood pressure elevation is likely related to the pain your and however it is still important to take your blood pressure daily, right down these values, and bring them to your next primary care doctor's ointment.

Please follow up with your primary care doctor within the next 2-3 days for ER follow-up.
(If you do not have a PCP you can call [phone]. ?to schedule an appointment with an [facility] Primary Care Provider)
IF YOU DEVELOP ANY NEW OR WORSENING SYMPTOMS, RETURN TO THE ER!
Please read the attached instructions, they highlight more specific treatments and interventions for you at home.
Thank you for letting me participate in your care,
Jessica Miller PA-C

Prescriptions:
New
  naproxen 500 mg tablet 
   500 mg PO BID PRN (Reason: pain) Qty: 20 0RF

No Action
  amoxicillin 500 mg capsule 
   500 mg PO BID Qty: 20 0RF
  guaifenesin 1,200 mg tablet extended release 12hr 
   1,200 mg PO Q12H Qty: 30 0RF
  benzonatate 200 mg capsule 
   200 mg PO TID PRN (Reason: cough) Qty: 30 0RF
  budesonide-formoterol [Symbicort] 80-4.5 mcg/actuation HFA aerosol inhaler 
     inhalation  
  albuterol sulfate 90 mcg/actuation HFA aerosol inhaler 
     inhalation  
  (DME) [employer]  Sp[employer] 
   See Rx Instructions  .ROUTE .MEDSUPPLY Qty: 1 
   Patient Comments:
   USE WITH INHALER
   Rx Instructions:
   As directed
  lisinopril 10 mg tablet 
   10 mg PO DAILY Qty: 90 0RF

Referrals:
Juan Jose Dejesus DO [Primary Care Provider] - 

Stand Alone Forms:  Patient Portal/API/Survey


ED Sign-out
<Danita Nolasco MD - Last Filed: 01/17/25 15:24>
Cosign
ED Attending Cosignature Attestation: 
I was immediately available in the department for consultation throughout this patient's visit.  Danita Nolasco MD

## 2025-01-17 NOTE — DI.RAD.S_ITS
PROCEDURE:  XR FOOT LT MIN 3V 
  
INDICATIONS:  foot pain 
  
TECHNIQUE:  3 views of the foot were acquired.   
  
COMPARISON:  [facility], , XR FOOT LT MIN 3V, 8/25/2023, 16:25. 
  
FINDINGS:   
  
Bones:  No fractures or dislocations.  No suspicious bony lesions.   
  
Soft tissues:  No tibiotalar joint effusion.  Achilles tendon appears normal.   
  
  
IMPRESSION:  
  
No acute bony abnormality. 
  
  
Dictated by: Stephen Alas M.D. on 1/17/2025 at 9:59      
Approved by: Stephen Alas M.D. on 1/17/2025 at 9:59

## 2025-02-19 ENCOUNTER — HOSPITAL ENCOUNTER (OUTPATIENT)
Age: 45
End: 2025-02-19
Payer: COMMERCIAL

## 2025-02-19 DIAGNOSIS — R50.9: Primary | ICD-10-CM

## 2025-02-19 DIAGNOSIS — J02.9: ICD-10-CM

## 2025-02-19 LAB
FLUAV RNA UPPER RESP QL NAA+PROBE: (no result)
FLUBV RNA UPPER RESP QL NAA+PROBE: (no result)

## 2025-02-19 PROCEDURE — 0241U: CPT

## 2025-03-28 ENCOUNTER — HOSPITAL ENCOUNTER (OUTPATIENT)
Dept: HOSPITAL 73 - RAD | Age: 45
End: 2025-03-28
Payer: COMMERCIAL

## 2025-03-28 DIAGNOSIS — M79.671: Primary | ICD-10-CM

## 2025-03-28 DIAGNOSIS — M79.672: ICD-10-CM

## 2025-03-28 DIAGNOSIS — E03.9: ICD-10-CM

## 2025-03-28 LAB
T3FREE SERPL-MCNC: 4.86 PG/ML (ref 2.77–5.27)
T4 FREE SERPL-MCNC: 1.21 NG/DL (ref 0.78–2.19)
T4 SERPL-MCNC: 8.69 UG/DL (ref 5.5–11)

## 2025-03-28 PROCEDURE — 36415 COLL VENOUS BLD VENIPUNCTURE: CPT

## 2025-03-28 PROCEDURE — 84436 ASSAY OF TOTAL THYROXINE: CPT

## 2025-03-28 PROCEDURE — 73630 X-RAY EXAM OF FOOT: CPT

## 2025-03-28 PROCEDURE — 86376 MICROSOMAL ANTIBODY EACH: CPT

## 2025-03-28 PROCEDURE — 84439 ASSAY OF FREE THYROXINE: CPT

## 2025-03-28 PROCEDURE — 84481 FREE ASSAY (FT-3): CPT

## 2025-03-28 NOTE — DI.RAD.S_ITS
PROCEDURE:  XR FOOT LT MIN 3V 
  
INDICATIONS:  BILAT FOOT PAIN 
  
TECHNIQUE:  3 views of the foot were acquired.   
  
COMPARISON:  [facility], CR, XR FOOT LT MIN 3V, 1/17/2025, 9:41. 
  
FINDINGS:   
  
Bones:  No fractures or dislocations.  No suspicious bony lesions.   
  
Soft tissues:  No tibiotalar joint effusion.  Achilles tendon appears normal.   
  
  
IMPRESSION:  
  
No acute bony abnormality. 
  
  
Dictated by: Mehdi Apple M.D. on 3/29/2025 at 5:01      
Approved by: Mehdi Apple M.D. on 3/29/2025 at 5:02

## 2025-03-28 NOTE — DI.RAD.S_ITS
PROCEDURE:  XR FOOT RT MIN 3V 
  
INDICATIONS:  BILAT FOOT PAIN 
  
TECHNIQUE:  3 views of the foot were acquired.   
  
COMPARISON:  [facility], CR, XR FOOT LT MIN 3V, 1/17/2025, 9:41. 
  
FINDINGS:   
  
Bones:  No fractures or dislocations.  No suspicious bony lesions.   
  
Soft tissues:  No tibiotalar joint effusion.  Achilles tendon appears normal.   
  
  
IMPRESSION:  
  
No acute bony abnormality. 
  
  
Dictated by: Mehdi Apple M.D. on 3/29/2025 at 4:58      
Approved by: Mehdi Apple M.D. on 3/29/2025 at 5:01

## 2025-05-20 ENCOUNTER — HOSPITAL ENCOUNTER (OUTPATIENT)
Age: 45
End: 2025-05-20
Payer: COMMERCIAL

## 2025-05-20 DIAGNOSIS — Z11.59: Primary | ICD-10-CM

## 2025-05-20 DIAGNOSIS — E78.5: ICD-10-CM

## 2025-05-20 DIAGNOSIS — Z13.1: ICD-10-CM

## 2025-05-20 DIAGNOSIS — I10: ICD-10-CM

## 2025-05-20 LAB
ALBUMIN SERPL-MCNC: 4.9 G/DL (ref 3.5–5)
ALBUMIN/GLOB SERPL: 1.6 {RATIO} (ref 1–2.8)
ALP SERPL-CCNC: 63 U/L (ref 38–126)
ALT SERPL-CCNC: 33 IU/L (ref ?–50)
BUN SERPL-MCNC: 14 MG/DL (ref 9–20)
CALCIUM SERPL-MCNC: 9.6 MG/DL (ref 8.4–10.2)
CHLORIDE SERPL-SCNC: 103 MMOL/L (ref 98–107)
CHOLEST SERPL-MCNC: 276 MG/DL (ref 140–199)
CO2 SERPL-SCNC: 24 MMOL/L (ref 22–32)
ESTIMATED GLOMERULAR FILT RATE: > 60 ML/MIN (ref 60–?)
GLOBULIN SER CALC-MCNC: 3.1 G/DL (ref 1.7–4.1)
GLUCOSE SERPL-MCNC: 86 MG/DL (ref 70–99)
HBA1C MFR BLD: 5.3 % (ref 4–6)
HDLC SERPL-MCNC: 60 MG/DL (ref 40–60)
HEMATOCRIT: 44.2 % (ref 41–53)
HEMOGLOBIN: 15.2 G/DL (ref 13.5–17.5)
HEMOLYSIS: < 15 (ref 0–50)
MCH RBC QN AUTO: 32.5 PG (ref 26–34)
MCV RBC: 94.7 FL (ref 80–100)
MEAN CORPUSCULAR HGB CONC: 34.3 % (ref 30–36)
PLATELET COUNT: 196 X10^3/UL (ref 150–400)
POTASSIUM SERPL-SCNC: 4.2 MMOL/L (ref 3.4–5.1)
SODIUM SERPL-SCNC: 137 MMOL/L (ref 137–145)
TRIGL SERPL-MCNC: 111 MG/DL (ref 35–150)

## 2025-05-20 PROCEDURE — 86803 HEPATITIS C AB TEST: CPT

## 2025-05-20 PROCEDURE — 85027 COMPLETE CBC AUTOMATED: CPT

## 2025-05-20 PROCEDURE — 36415 COLL VENOUS BLD VENIPUNCTURE: CPT

## 2025-05-20 PROCEDURE — 80061 LIPID PANEL: CPT

## 2025-05-20 PROCEDURE — 83036 HEMOGLOBIN GLYCOSYLATED A1C: CPT

## 2025-05-20 PROCEDURE — 82570 ASSAY OF URINE CREATININE: CPT

## 2025-05-20 PROCEDURE — 82043 UR ALBUMIN QUANTITATIVE: CPT

## 2025-05-20 PROCEDURE — 80053 COMPREHEN METABOLIC PANEL: CPT

## 2025-05-21 LAB — HCV AB SERPL QL IA: NEGATIVE S/C
